# Patient Record
Sex: FEMALE | URBAN - METROPOLITAN AREA
[De-identification: names, ages, dates, MRNs, and addresses within clinical notes are randomized per-mention and may not be internally consistent; named-entity substitution may affect disease eponyms.]

---

## 2018-06-11 ENCOUNTER — APPOINTMENT (RX ONLY)
Dept: URBAN - METROPOLITAN AREA CLINIC 95 | Facility: CLINIC | Age: 23
Setting detail: DERMATOLOGY
End: 2018-06-11

## 2018-06-11 DIAGNOSIS — T07XXXA INSECT BITE, NONVENOMOUS, OF OTHER, MULTIPLE, AND UNSPECIFIED SITES, WITHOUT MENTION OF INFECTION: ICD-10-CM

## 2018-06-11 PROBLEM — S70.362A INSECT BITE (NONVENOMOUS), LEFT THIGH, INITIAL ENCOUNTER: Status: ACTIVE | Noted: 2018-06-11

## 2018-06-11 PROCEDURE — ? DIAGNOSIS COMMENT

## 2018-06-11 PROCEDURE — ? TREATMENT REGIMEN

## 2018-06-11 PROCEDURE — ? COUNSELING

## 2018-06-11 PROCEDURE — ? PRESCRIPTION

## 2018-06-11 PROCEDURE — ? ADDITIONAL NOTES

## 2018-06-11 PROCEDURE — 99202 OFFICE O/P NEW SF 15 MIN: CPT

## 2018-06-11 PROCEDURE — ? ORDER TESTS

## 2018-06-11 RX ORDER — SULFAMETHOXAZOLE AND TRIMETHOPRIM 800; 160 MG/1; MG/1
1 TABLET ORAL BID
Qty: 20 | Refills: 0 | Status: ERX | COMMUNITY
Start: 2018-06-11

## 2018-06-11 RX ORDER — SALICYLIC ACID 30 MG/G
1 OINTMENT TOPICAL TID
Qty: 1 | Refills: 1 | Status: ERX | COMMUNITY
Start: 2018-06-11

## 2018-06-11 RX ADMIN — SALICYLIC ACID 1: 30 OINTMENT TOPICAL at 00:00

## 2018-06-11 RX ADMIN — SULFAMETHOXAZOLE AND TRIMETHOPRIM 1: 800; 160 TABLET ORAL at 00:00

## 2018-06-11 ASSESSMENT — LOCATION DETAILED DESCRIPTION DERM: LOCATION DETAILED: LEFT ANTERIOR DISTAL THIGH

## 2018-06-11 ASSESSMENT — LOCATION ZONE DERM: LOCATION ZONE: LEG

## 2018-06-11 ASSESSMENT — LOCATION SIMPLE DESCRIPTION DERM: LOCATION SIMPLE: LEFT THIGH

## 2018-06-11 NOTE — HPI: BUMPS
How Severe Are Your Bumps?: moderate
Have Your Bumps Been Treated?: not been treated
Is This A New Presentation, Or A Follow-Up?: Bumps
Additional History: Patient was given Doxy 100mg BID by PCP for 10 days at first, then changed to Clindamycin QID after not helping. Tried Lotrizone crm but made worse. Presently using bacitracin oint.

## 2018-06-11 NOTE — PROCEDURE: ADDITIONAL NOTES
Additional Notes: Probable spider bite (brown recluse)\\n\\nI advised patient that she should have an  check her home.  I discussed scarring with patient and risks of secondary infections

## 2018-06-11 NOTE — PROCEDURE: TREATMENT REGIMEN
Plan: Culture obtained today. Start Bactrim DS BID for 10 days, Bensal HP TID. Reviewed side effects of Bactrim at length including rash.
Detail Level: Zone

## 2018-06-25 ENCOUNTER — APPOINTMENT (RX ONLY)
Dept: URBAN - METROPOLITAN AREA CLINIC 95 | Facility: CLINIC | Age: 23
Setting detail: DERMATOLOGY
End: 2018-06-25

## 2018-06-25 DIAGNOSIS — T07XXXA INSECT BITE, NONVENOMOUS, OF OTHER, MULTIPLE, AND UNSPECIFIED SITES, WITHOUT MENTION OF INFECTION: ICD-10-CM

## 2018-06-25 PROBLEM — F41.9 ANXIETY DISORDER, UNSPECIFIED: Status: ACTIVE | Noted: 2018-06-25

## 2018-06-25 PROBLEM — T07.XXXA UNSPECIFIED MULTIPLE INJURIES, INITIAL ENCOUNTER: Status: ACTIVE | Noted: 2018-06-25

## 2018-06-25 PROCEDURE — ? TREATMENT REGIMEN

## 2018-06-25 PROCEDURE — 99213 OFFICE O/P EST LOW 20 MIN: CPT

## 2018-06-25 NOTE — PROCEDURE: TREATMENT REGIMEN
Detail Level: Zone
Plan: Samples of Impoyz to apply BID. D/C Asad HP. Consult with Dr Wayne. Discussed biopsy if no improvement at follow up.

## 2018-06-25 NOTE — HPI: RASH (SPIDER BITE)
How Severe Is It?: mild
Is This A New Presentation, Or A Follow-Up?: Follow Up Spider Bite
Additional History: Follow up 6/11/18. Took 10 days of Bactrim DS BID, Bensal HP BID. No new spots.

## 2018-07-09 ENCOUNTER — APPOINTMENT (RX ONLY)
Dept: URBAN - METROPOLITAN AREA CLINIC 95 | Facility: CLINIC | Age: 23
Setting detail: DERMATOLOGY
End: 2018-07-09

## 2018-07-09 DIAGNOSIS — T07XXXA INSECT BITE, NONVENOMOUS, OF OTHER, MULTIPLE, AND UNSPECIFIED SITES, WITHOUT MENTION OF INFECTION: ICD-10-CM | Status: IMPROVED

## 2018-07-09 PROBLEM — S70.362A INSECT BITE (NONVENOMOUS), LEFT THIGH, INITIAL ENCOUNTER: Status: ACTIVE | Noted: 2018-07-09

## 2018-07-09 PROCEDURE — 99213 OFFICE O/P EST LOW 20 MIN: CPT

## 2018-07-09 PROCEDURE — ? COUNSELING

## 2018-07-09 PROCEDURE — ? TREATMENT REGIMEN

## 2018-07-09 ASSESSMENT — LOCATION SIMPLE DESCRIPTION DERM: LOCATION SIMPLE: LEFT THIGH

## 2018-07-09 ASSESSMENT — LOCATION ZONE DERM: LOCATION ZONE: LEG

## 2018-07-09 ASSESSMENT — LOCATION DETAILED DESCRIPTION DERM: LOCATION DETAILED: LEFT ANTERIOR PROXIMAL THIGH

## 2018-07-09 NOTE — PROCEDURE: TREATMENT REGIMEN
Continue Regimen: Impoyz cream bid x 2 weeks stressing a tiny amount to the site only, 2 wks off and repeat prn- samples from last visit were given and she has plenty.  Use only for the next 4-6 weeks
Detail Level: Zone
Plan: Since areas improving, with defer biopsy today.  Advised patient to return to the clinic if worsening.